# Patient Record
Sex: FEMALE | Race: BLACK OR AFRICAN AMERICAN | NOT HISPANIC OR LATINO | URBAN - METROPOLITAN AREA
[De-identification: names, ages, dates, MRNs, and addresses within clinical notes are randomized per-mention and may not be internally consistent; named-entity substitution may affect disease eponyms.]

---

## 2018-08-08 PROBLEM — 301717006 RIGHT UPPER QUADRANT PAIN: Status: ACTIVE | Noted: 2018-08-08

## 2018-08-08 PROBLEM — 79922009 EPIGASTRIC PAIN: Status: ACTIVE | Noted: 2018-08-08

## 2020-07-21 ENCOUNTER — TELEPHONE ENCOUNTER (OUTPATIENT)
Dept: URBAN - METROPOLITAN AREA CLINIC 35 | Facility: CLINIC | Age: 55
End: 2020-07-21

## 2020-09-30 ENCOUNTER — OFFICE VISIT (OUTPATIENT)
Dept: URBAN - METROPOLITAN AREA CLINIC 35 | Facility: CLINIC | Age: 55
End: 2020-09-30

## 2020-09-30 VITALS
HEIGHT: 66 IN | BODY MASS INDEX: 19.61 KG/M2 | TEMPERATURE: 97.4 F | DIASTOLIC BLOOD PRESSURE: 70 MMHG | SYSTOLIC BLOOD PRESSURE: 105 MMHG | OXYGEN SATURATION: 99 % | WEIGHT: 122 LBS | HEART RATE: 65 BPM

## 2020-09-30 PROBLEM — 3696007 FUNCTIONAL DYSPEPSIA: Status: ACTIVE | Noted: 2017-10-11

## 2020-09-30 RX ORDER — DEXLANSOPRAZOLE 60 MG/1
1 CAPSULE CAPSULE, DELAYED RELEASE ORAL ONCE A DAY
Qty: 30 | Refills: 2 | Status: DISCONTINUED | COMMUNITY
Start: 2018-12-12

## 2020-09-30 RX ORDER — ESOMEPRAZOLE MAGNESIUM 40 MG/1
1 CAPSULE CAPSULE, DELAYED RELEASE ORAL ONCE A DAY
Qty: 30 | Refills: 2 | Status: DISCONTINUED | COMMUNITY
Start: 2018-12-13

## 2020-09-30 RX ORDER — FAMOTIDINE 20 MG/1
1 TABLET AT BEDTIME AS NEEDED TABLET, FILM COATED ORAL ONCE A DAY
Qty: 30 | Status: ACTIVE | COMMUNITY

## 2020-09-30 RX ORDER — DEXLANSOPRAZOLE 60 MG/1
1 CAPSULE CAPSULE, DELAYED RELEASE ORAL ONCE A DAY
Status: DISCONTINUED | COMMUNITY

## 2020-09-30 RX ORDER — OMEPRAZOLE 20 MG/1
1 CAPSULE, DELAYED RELEASE ORAL DAILY
Status: ON HOLD | COMMUNITY

## 2020-09-30 NOTE — HPI-MIGRATED HPI
;     Acid Reflux : Patient is a 55-year-old  female, who presents today complaining of acid reflux 3-4 years mainly in the evening post prandial almost everyday. Admits relief with dietary modification. She tries to avoid greasy and heavy meals and eat a lot of fruit and vegetable. She takes Famotidine 20 mg as needed with relief.  She is belching frequently.  She states she had Dexilant in the past with the best benefits.   Last EGD performed on 11/14/2017 revealed fundic polyps, mild gastritis.  Admits associated symptoms of frequent eructations, rare gas/bloating after certain food that contributes to indigestion, occasional regurgitation with solid/chewy food like chicken. Patient denies dysphagia, globus, sour eructations,, changes in appetite, early satiety, coughing, nausea, vomiting, abdominal/epigastric pain, or changes in bowel habits. ;

## 2020-11-18 ENCOUNTER — DASHBOARD ENCOUNTERS (OUTPATIENT)
Age: 55
End: 2020-11-18

## 2020-11-18 ENCOUNTER — OFFICE VISIT (OUTPATIENT)
Dept: URBAN - METROPOLITAN AREA CLINIC 35 | Facility: CLINIC | Age: 55
End: 2020-11-18

## 2020-11-18 VITALS
DIASTOLIC BLOOD PRESSURE: 60 MMHG | HEART RATE: 74 BPM | SYSTOLIC BLOOD PRESSURE: 100 MMHG | TEMPERATURE: 97.9 F | WEIGHT: 122.4 LBS | HEIGHT: 66 IN | OXYGEN SATURATION: 100 % | BODY MASS INDEX: 19.67 KG/M2

## 2020-11-18 RX ORDER — OMEPRAZOLE 20 MG/1
1 CAPSULE, DELAYED RELEASE ORAL DAILY
Status: ON HOLD | COMMUNITY

## 2020-11-18 RX ORDER — FAMOTIDINE 20 MG/1
1 TABLET AT BEDTIME AS NEEDED TABLET, FILM COATED ORAL ONCE A DAY
Qty: 30 | Status: ON HOLD | COMMUNITY

## 2020-11-18 NOTE — HPI-MIGRATED HPI
;     Acid Reflux : Patient is a 55-year-old  female, who presents today for a f/u of acid reflux. Admits significant relief of sxs with FODMAP diet and Dexilant as needed. Also admits epigastic pain along with belching.   Patient denies dysphagia, globus, sour eructations, bloating/gas, indigestion, early satiety, changes in appetite, coughing, nausea, vomiting, or changes in bowel habits.   Last visit (09/30/2020)          Patient is a 55-year-old  female, who presents today complaining of acid reflux 3-4 years mainly in the evening post prandial almost everyday. Admits relief with dietary modification. She tries to avoid greasy and heavy meals and eat a lot of fruit and vegetable. She takes Famotidine 20 mg as needed with relief.  She is belching frequently.  She states she had Dexilant in the past with the best benefits.   Last EGD performed on 11/14/2017 revealed fundic polyps, mild gastritis.  Admits associated symptoms of frequent eructations, rare gas/bloating after certain food that contributes to indigestion, occasional regurgitation with solid/chewy food like chicken. Patient denies dysphagia, globus, sour eructations,, changes in appetite, early satiety, coughing, nausea, vomiting, abdominal/epigastric pain, or changes in bowel habits. ;